# Patient Record
Sex: FEMALE | Race: WHITE | Employment: FULL TIME | URBAN - METROPOLITAN AREA
[De-identification: names, ages, dates, MRNs, and addresses within clinical notes are randomized per-mention and may not be internally consistent; named-entity substitution may affect disease eponyms.]

---

## 2018-11-21 PROBLEM — N81.6 RECTOCELE: Status: ACTIVE | Noted: 2018-11-21

## 2018-11-21 PROBLEM — Z90.722 HISTORY OF BILATERAL SALPINGO-OOPHORECTOMY (BSO): Status: ACTIVE | Noted: 2018-11-21

## 2018-11-21 PROBLEM — Z90.79 HISTORY OF BILATERAL SALPINGO-OOPHORECTOMY (BSO): Status: ACTIVE | Noted: 2018-11-21

## 2018-11-21 PROBLEM — Z90.710 HISTORY OF VAGINAL HYSTERECTOMY: Status: ACTIVE | Noted: 2018-11-21

## 2018-12-10 ENCOUNTER — TELEPHONE (OUTPATIENT)
Dept: UROLOGY | Facility: HOSPITAL | Age: 63
End: 2018-12-10

## 2018-12-10 NOTE — TELEPHONE ENCOUNTER
Pt called inquiring if okay for her to do her normal activities, re:  Walking, bowling, yoga, until she sees Dr. Gillian Pfeiffer in February, about her rectocele. Pt denies constipation. Informed her it would be fine for her to continue normal activities.    Pt aske

## 2019-02-11 ENCOUNTER — OFFICE VISIT (OUTPATIENT)
Dept: UROLOGY | Facility: HOSPITAL | Age: 64
End: 2019-02-11
Attending: OBSTETRICS & GYNECOLOGY
Payer: COMMERCIAL

## 2019-02-11 VITALS — HEIGHT: 63 IN | BODY MASS INDEX: 33.84 KG/M2 | WEIGHT: 191 LBS

## 2019-02-11 DIAGNOSIS — N95.2 POSTMENOPAUSAL ATROPHIC VAGINITIS: Primary | ICD-10-CM

## 2019-02-11 DIAGNOSIS — N81.6 RECTOCELE: ICD-10-CM

## 2019-02-11 PROCEDURE — 99201 HC OUTPT EVAL AND MGNT NEW PT LEVEL 1: CPT

## 2019-02-11 RX ORDER — ESTRADIOL 0.1 MG/G
CREAM VAGINAL
Qty: 1 TUBE | Refills: 3 | Status: SHIPPED | OUTPATIENT
Start: 2019-02-11 | End: 2020-05-11

## 2019-04-22 PROBLEM — Z90.79 HISTORY OF BILATERAL SALPINGO-OOPHORECTOMY (BSO): Status: RESOLVED | Noted: 2018-11-21 | Resolved: 2019-04-22

## 2019-04-22 PROBLEM — E03.9 HYPOTHYROID: Status: ACTIVE | Noted: 2019-04-22

## 2019-04-22 PROBLEM — Z90.722 HISTORY OF BILATERAL SALPINGO-OOPHORECTOMY (BSO): Status: RESOLVED | Noted: 2018-11-21 | Resolved: 2019-04-22

## 2019-04-22 PROBLEM — N81.6 RECTOCELE: Status: RESOLVED | Noted: 2018-11-21 | Resolved: 2019-04-22

## 2019-04-22 PROBLEM — Z90.710 HISTORY OF VAGINAL HYSTERECTOMY: Status: RESOLVED | Noted: 2018-11-21 | Resolved: 2019-04-22

## 2019-08-05 ENCOUNTER — OFFICE VISIT (OUTPATIENT)
Dept: UROLOGY | Facility: HOSPITAL | Age: 64
End: 2019-08-05
Attending: OBSTETRICS & GYNECOLOGY
Payer: COMMERCIAL

## 2019-08-05 VITALS
DIASTOLIC BLOOD PRESSURE: 82 MMHG | SYSTOLIC BLOOD PRESSURE: 128 MMHG | HEIGHT: 62.25 IN | WEIGHT: 190 LBS | BODY MASS INDEX: 34.52 KG/M2

## 2019-08-05 DIAGNOSIS — Z98.890 POST-OPERATIVE STATE: ICD-10-CM

## 2019-08-05 DIAGNOSIS — N95.2 POSTMENOPAUSAL ATROPHIC VAGINITIS: Primary | ICD-10-CM

## 2019-08-05 PROCEDURE — 99211 OFF/OP EST MAY X REQ PHY/QHP: CPT

## 2020-05-11 ENCOUNTER — OFFICE VISIT (OUTPATIENT)
Dept: UROLOGY | Facility: HOSPITAL | Age: 65
End: 2020-05-11
Attending: OBSTETRICS & GYNECOLOGY
Payer: COMMERCIAL

## 2020-05-11 VITALS
DIASTOLIC BLOOD PRESSURE: 82 MMHG | SYSTOLIC BLOOD PRESSURE: 132 MMHG | HEIGHT: 62.5 IN | WEIGHT: 198 LBS | BODY MASS INDEX: 35.52 KG/M2

## 2020-05-11 DIAGNOSIS — N81.6 RECTOCELE: Primary | ICD-10-CM

## 2020-05-11 DIAGNOSIS — N95.2 POSTMENOPAUSAL ATROPHIC VAGINITIS: ICD-10-CM

## 2020-05-11 PROCEDURE — 99212 OFFICE O/P EST SF 10 MIN: CPT

## 2021-05-14 ENCOUNTER — OFFICE VISIT (OUTPATIENT)
Dept: UROLOGY | Facility: HOSPITAL | Age: 66
End: 2021-05-14
Attending: OBSTETRICS & GYNECOLOGY
Payer: COMMERCIAL

## 2021-05-14 VITALS — HEIGHT: 62.5 IN | WEIGHT: 198 LBS | TEMPERATURE: 97 F | BODY MASS INDEX: 35.52 KG/M2

## 2021-05-14 DIAGNOSIS — N95.2 POSTMENOPAUSAL ATROPHIC VAGINITIS: ICD-10-CM

## 2021-05-14 DIAGNOSIS — N81.84 PELVIC MUSCLE WASTING: ICD-10-CM

## 2021-05-14 DIAGNOSIS — R35.1 NOCTURIA: ICD-10-CM

## 2021-05-14 DIAGNOSIS — N81.6 RECTOCELE: Primary | ICD-10-CM

## 2021-05-14 PROCEDURE — 99212 OFFICE O/P EST SF 10 MIN: CPT

## 2021-05-14 RX ORDER — WHEAT DEXTRIN 3 G/3.8 G
POWDER (GRAM) ORAL
COMMUNITY

## 2022-05-16 ENCOUNTER — OFFICE VISIT (OUTPATIENT)
Dept: UROLOGY | Facility: CLINIC | Age: 67
End: 2022-05-16
Attending: OBSTETRICS & GYNECOLOGY
Payer: COMMERCIAL

## 2022-05-16 VITALS — HEIGHT: 62 IN | BODY MASS INDEX: 34.78 KG/M2 | TEMPERATURE: 98 F | WEIGHT: 189 LBS

## 2022-05-16 DIAGNOSIS — N95.2 POSTMENOPAUSAL ATROPHIC VAGINITIS: Primary | ICD-10-CM

## 2022-05-16 PROCEDURE — 57160 INSERT PESSARY/OTHER DEVICE: CPT

## 2022-05-16 PROCEDURE — 99212 OFFICE O/P EST SF 10 MIN: CPT

## 2022-05-16 RX ORDER — ESTRADIOL 0.1 MG/G
CREAM VAGINAL
Qty: 42.5 G | Refills: 3 | Status: SHIPPED | OUTPATIENT
Start: 2022-05-16

## 2022-05-16 NOTE — PROGRESS NOTES
Dr Gil Keto placed pessary. Plan to follow up 5/26/2022 for pessary check and possible self management.

## 2022-05-26 ENCOUNTER — OFFICE VISIT (OUTPATIENT)
Dept: UROLOGY | Facility: CLINIC | Age: 67
End: 2022-05-26
Attending: OBSTETRICS & GYNECOLOGY
Payer: COMMERCIAL

## 2022-05-26 VITALS — WEIGHT: 189 LBS | HEIGHT: 62 IN | TEMPERATURE: 98 F | BODY MASS INDEX: 34.78 KG/M2

## 2022-05-26 DIAGNOSIS — N81.84 PELVIC MUSCLE WASTING: ICD-10-CM

## 2022-05-26 DIAGNOSIS — N90.4 VULVAR DYSTROPHY: Primary | ICD-10-CM

## 2022-05-26 DIAGNOSIS — N81.6 RECTOCELE: ICD-10-CM

## 2022-05-26 DIAGNOSIS — N95.2 POSTMENOPAUSAL ATROPHIC VAGINITIS: ICD-10-CM

## 2022-05-26 PROCEDURE — 99212 OFFICE O/P EST SF 10 MIN: CPT

## 2022-05-26 RX ORDER — CLOBETASOL PROPIONATE 0.5 MG/G
1 OINTMENT TOPICAL 2 TIMES DAILY
Qty: 60 G | Refills: 3 | Status: SHIPPED | OUTPATIENT
Start: 2022-05-26

## 2022-08-19 ENCOUNTER — OFFICE VISIT (OUTPATIENT)
Dept: UROLOGY | Facility: CLINIC | Age: 67
End: 2022-08-19
Attending: OBSTETRICS & GYNECOLOGY
Payer: COMMERCIAL

## 2022-08-19 VITALS — HEIGHT: 62 IN | WEIGHT: 189 LBS | TEMPERATURE: 98 F | BODY MASS INDEX: 34.78 KG/M2

## 2022-08-19 DIAGNOSIS — N90.4 VULVAR DYSTROPHY: Primary | ICD-10-CM

## 2022-08-19 PROCEDURE — 99212 OFFICE O/P EST SF 10 MIN: CPT

## 2023-02-24 ENCOUNTER — OFFICE VISIT (OUTPATIENT)
Dept: UROLOGY | Facility: CLINIC | Age: 68
End: 2023-02-24
Attending: OBSTETRICS & GYNECOLOGY
Payer: MEDICARE

## 2023-02-24 VITALS — BODY MASS INDEX: 34.78 KG/M2 | HEIGHT: 62 IN | TEMPERATURE: 97 F | WEIGHT: 189 LBS

## 2023-02-24 DIAGNOSIS — N90.4 VULVAR DYSTROPHY: Primary | ICD-10-CM

## 2023-02-24 DIAGNOSIS — N81.6 RECTOCELE: ICD-10-CM

## 2023-02-24 DIAGNOSIS — N95.2 POSTMENOPAUSAL ATROPHIC VAGINITIS: ICD-10-CM

## 2023-02-24 PROCEDURE — 99212 OFFICE O/P EST SF 10 MIN: CPT

## 2024-02-13 ENCOUNTER — TELEPHONE (OUTPATIENT)
Dept: UROLOGY | Facility: CLINIC | Age: 69
End: 2024-02-13

## 2024-02-13 NOTE — TELEPHONE ENCOUNTER
Spoke to patient to remind of Yearly appointment on 2/23/24.  Patient confirmed appointment date, time, and clinic location.

## 2024-02-23 ENCOUNTER — OFFICE VISIT (OUTPATIENT)
Dept: UROLOGY | Facility: CLINIC | Age: 69
End: 2024-02-23
Attending: OBSTETRICS & GYNECOLOGY
Payer: MEDICARE

## 2024-02-23 DIAGNOSIS — N81.9 VAGINAL VAULT PROLAPSE: ICD-10-CM

## 2024-02-23 DIAGNOSIS — N81.6 RECTOCELE: Primary | ICD-10-CM

## 2024-02-23 DIAGNOSIS — N95.2 POSTMENOPAUSAL ATROPHIC VAGINITIS: ICD-10-CM

## 2024-02-23 PROCEDURE — 99212 OFFICE O/P EST SF 10 MIN: CPT

## 2024-02-23 RX ORDER — ESTRADIOL 0.1 MG/G
CREAM VAGINAL
Qty: 42.5 G | Refills: 3 | Status: SHIPPED
Start: 2024-02-23

## 2024-02-23 RX ORDER — ESTRADIOL 0.1 MG/G
CREAM VAGINAL
Qty: 42.5 G | Refills: 3 | Status: SHIPPED | OUTPATIENT
Start: 2024-02-23 | End: 2024-02-23

## 2024-02-23 RX ORDER — NYSTATIN 100000 U/G
1 OINTMENT TOPICAL 2 TIMES DAILY
Qty: 30 G | Refills: 0 | Status: SHIPPED | OUTPATIENT
Start: 2024-02-23

## 2024-08-26 ENCOUNTER — TELEPHONE (OUTPATIENT)
Dept: UROLOGY | Facility: CLINIC | Age: 69
End: 2024-08-26

## 2024-08-26 NOTE — TELEPHONE ENCOUNTER
Refill for estrace from pt received via . Call placed t pt that refill was sent to her pharmacy at time of visit in February. States her pharmacy told her they had problems and Rx never came through. Prescription resent electronically.

## 2025-02-18 ENCOUNTER — TELEPHONE (OUTPATIENT)
Dept: UROLOGY | Facility: CLINIC | Age: 70
End: 2025-02-18

## 2025-02-27 ENCOUNTER — OFFICE VISIT (OUTPATIENT)
Dept: UROLOGY | Facility: CLINIC | Age: 70
End: 2025-02-27
Attending: OBSTETRICS & GYNECOLOGY
Payer: MEDICARE

## 2025-02-27 VITALS — HEIGHT: 62 IN | BODY MASS INDEX: 35 KG/M2 | RESPIRATION RATE: 16 BRPM | TEMPERATURE: 98 F

## 2025-02-27 DIAGNOSIS — N81.84 PELVIC MUSCLE WASTING: ICD-10-CM

## 2025-02-27 DIAGNOSIS — N81.6 RECTOCELE: ICD-10-CM

## 2025-02-27 DIAGNOSIS — N95.2 POSTMENOPAUSAL ATROPHIC VAGINITIS: ICD-10-CM

## 2025-02-27 DIAGNOSIS — N81.9 VAGINAL VAULT PROLAPSE: Primary | ICD-10-CM

## 2025-02-27 PROCEDURE — 99212 OFFICE O/P EST SF 10 MIN: CPT

## 2025-02-27 NOTE — PATIENT INSTRUCTIONS
Name Address [facility] Phone/Fax Contact   [facility] [address]  [address]   [facility]   [address] PH: [phone]  FAX: [phone]   Celsa Her     [facility] [address]   [facility]   [address] PH: [phone]  FAX: [phone]    [facility]   [address]   [place]   [place]   [address]   PH: [phone]    [facility] [address]   [facility]   [address] PH: [phone]  FAX: [phone]   Benita Plummer   [facility]   [address]    [facility]    [address] PH: [phone]  FAX: [phone] Key Mccord Benjamin [facility]   [facility]   [address]     [facility]     [address]   PH: [phone]  FAX: [phone]   Destiny Driscoll   [facility] [address]  [address]   [facility]   [address] PH: [phone]  FAX: [phone]   Shanti Mosqueda   [facility]    [address]   [facility]   [address]   PH: [phone]   Anthony Corona     [facility] [address]   [facility]   [address] PH: [phone]  FAX: [phone] [facility]  [facility]   [address]   [facility]   [address] PH: [phone]  FAX: [phone]        [facility]   [address]   [facility]   [address] PH: [phone]  FAX: [phone]   Chanelle Chaudhari   [facility] [address]  [address]   [facility]   [address] PH: [phone]  FAX: [phone]   Lisette Yan   [facility]   [address]   [facility]   [address] PH: [phone]  FAX: [phone] Lili Paul  [facility] [address] [place] [address] [address]. Foss  300   [facility]   [address] PH: [phone]  FAX: [phone] Lisette Madrid  Lena Sadia   [facility] [address]   [facility]   [address] PH: [phone]  FAX: [phone]   Kadie Fernandez     [facility] [address]   [facility]   [address] PH: [phone]  FAX: [phone]   Kelle Blunt   [facility] [address] [address]   [facility]   [address] PH: [phone]  FAX: [phone]   Alayna Robins     [facility] [address] [facility]   [address] PH: [phone]  FAX: [phone]      [facility]   [address]   [address]   PH: [phone]   Britta Joyner     [facility] [address] [facility]   [address] PH: [phone]  FAX: [phone]   [facility] [address] [facility]   [address] PH: [phone]  FAX: [phone] Francia Hernandez [facility] [address]  [address]   [facility]   [address]   PH: [phone] Anton[facility] Crockett [facility] [address] [facility]   [address] PH: [phone]  FAX: [phone]    [facility]   [address] [facility]   [address] PH: [phone]  FAX: [phone]   Elida Nguyen     [facility] [address] [address] [facility]   [address] PH: [phone]  FAX: [phone]      [facility]   [address] [facility]   [address] PH: [phone]  FAX: [phone]    [facility] [address]  [address]   [facility]   [address] PH: [phone]  FAX: [phone]   Deja Mcgarry   [facility]   [address] [facility]   [address] PH: [phone]  FAX: [phone]   [facility] [address] [place] [address] [address]. [address]   [facility]    [address] PH: [phone]  FAX: [phone]   Neeru Xavier   [facility]   [address]   [place]   [place]   [address] PH: [phone]  FAX: [phone] Mari Dickersonceciliaramiro Matti   [facility]   [address]   [facility]   [address] PH: [phone]  FAX: [phone]   Juju [facility]   [address]   [facility]   [address] PH: [phone]  FAX: [phone] [facility]     [facility]   [address] [facility]   [address] PH: [phone]  FAX: [phone]      [facility]   [address] [facility]   [address] PH: [phone]  FAX: [phone] Delilah Waggoner [facility]   [address] [facility] [facility]   [address]   PH: [phone] Elma Gonzalez   [facility]   [address]   [facility]   [address] PH: [phone]  FAX: [phone] Chanelle Parham [facility]   [address]   [place]   [place]   [address] PH: [phone]  FAX: [phone]   Aysha Pedro   [facility]   [address]   [facility]   [address]   PH: [phone]    [facility] [address]  Lower Level   [place]   [place]   [address]   PH: [phone]      [facility]   [address]   [place]   [address] PH: [phone]  FAX: [phone]      [facility] [address]  [address]   [facility]   [address] PH: [phone]  FAX: [phone]   Madonna Vargas   [facility] [address]   Ronnie [place]GRE   [address] PH: [phone]  FAX: [phone]   Aubrey Thompson     [facility] [address] [place] [address].  [address]   Ronnie UNGER   [address]   PH: [phone] Cecily Brizuelalife  Physical Therapy [address] [address]   [place]   [address] PH: [phone]  FAX: [phone]   Mary Novoa   [facility]   [address]   [facility]   [address] PH: [phone]  FAX: [phone]   Lupis Mckay     [facility] [address] [place]  [address]   [facility]   [address] PH:       [facility]   [address] [facility]   [address] PH: [phone]  FAX: [phone]    [facility] [address] [facility]. 2nd Floor   [facility]   [address]   PH: [phone]    [facility] [address] [facility]  [address]   [facility]   [address] PH: [phone]  FAX: [phone] Babs Kaplan   [facility]   [address] [facility]   [address] PH: [phone]  FAX: [phone] Amanda Blakely     [facility]   [address] [facility]   [address] PH: [phone]  FAX: [phone]      [facility]   [address].   [place]   [address] PH: [phone]  FAX: [phone]    [facility] [address]  [address]   [place]   [address] PH: [phone]  FAX: [phone] Eam Pereainal     [facility] [address]   [facility]   [address] PH: [phone]  FAX: [phone]   Amanda Perkins   [facility] [address] [place] [address] [address]   [facility]   [address] PH: [phone]  FAX: [phone] Anne Albarran [facility] [address] [facility] [facility] [address]  [address]   [facility]   [address] PH: [phone]  FAX: [phone] Dayana Pantoja  [facility]   [address] [facility] [address]   [place]   [address] PH: [phone]  FAX: [phone]   [facility] [address] [place] [address] 121   [place]   [place]   [address] PH:  [phone]  FAX: [phone]   Lisette Valentino     [facility] [address]  [address]   [facility]   [address]   PH: [phone]   Jayda Sarmiento   [facility] [address]  [address]   [facility]   [address] PH: [phone]  FAX: [phone] John Blakely     [facility] [address]   [facility]   [address] PH: [phone]  FAX: [phone]   [facility]   [address] [facility] [address]   [facility]   [address] PH: [phone]  FAX: [phone]   Andreina Huffman     [facility] [address]   [facility]   [address] PH: [phone]  FAX: [phone]    [facility] [address]   [facility]   [address] PH: [phone]  FAX: [phone]      [facility]   [address]   [facility]   [address] PH: [phone]  FAX: [phone]    [facility]   [address]   [facility]   [address] PH: [phone]  FAX: [phone] Pattie Blackwood     [facility]   [address]   [facility]   [address] PH: [phone]  FAX: [phone]   Rayna Jaffe   [facility]   [address]   [facility]   [address] PH: [phone]  FAX: [phone] Dayana Magallon     [facility]   [address]   [facility]   [address] PH: [phone]  FAX: [phone]      [facility] [address] [facility], [address]   [facility]   [address] PH: [phone]  FAX: [phone] [facility] on [facility]   [facility] [address] [address] [place] [address]. A [address]   [facility]   [address] PH: [phone]  FAX: [phone] Maria Elena Wills Denia     [facility]   [address]   [facility]   [address] PH:  [phone]  FAX: [phone]    [facility] [address]  [address]   [facility]   [address] PH: [phone]  FAX: [phone]   Yamileth Mayes   [facility] [address]   [facility]   [address] PH: [phone]  FAX: [phone]   [facility] [address]  [address]   [facility]   [address]   PH: [phone]      [facility]   [address] [place]   [place]   [address] PH: [phone]  FAX: [phone]   Lulu Knowles   [facility] [address]  [address]   [facility]   [address] PH: [phone]  FAX: [phone] Alisa Yoon   [facility] [address]   [facility]   [address] PH: [phone]  FAX: [phone]   Pattie De La Garza     [facility] [address]   [facility]   [address] PH: [phone]  FAX: [phone]   Marybeth Hair   [facility] [address]   [facility]   [address]   PH: [phone]      [facility] [address]   [facility]   [address] PH: [phone]  FAX: [phone]    [facility] [address]  [address]   [facility]    [address] PH: [phone]  FAX: [phone] Malia Moreno   [facility] [address]  [address]   [facility]   [address] PH: [phone]  FAX: [phone]   Mary Nuñez   [facility] [address]   [facility]   [address] PH: [phone]  FAX: [phone]   Red Domínguez

## 2025-02-27 NOTE — PROGRESS NOTES
Pt presents to follow up bulge  Doing well with #3 ring with support pessary, home care  Reports pessary is comfortable   Denies discharge  Denies bleeding  Denies s/sx of UTI  Bowels regular, some FI, using fiber daily   Pt is removing pessary at home every other week   Pt is using vaginal estrogen cream three times weekly  S/P VH, BS, PER, and cysto in 2015    Happy with pessary, feels supported  She is not currently interested in surgical management of her pelvic organ prolapse.    Urogynecology Summary:  Urogynecology Summary  Prolapse: Yes  TOMI: No  Urge Incontinence: No  Nocturia Frequency: 2  Frequency: 2 hours  Incomplete emptying: No  Constipation: No  Wears pad day?:  (pantyliner)  Wears Pad Night?:  (pantyliner)  Activities are limited by UI/POP?: No  Currently Sexually Active: No      Vitals:  Vitals:    02/27/25 0925   Resp: 16   Temp: 98 °F (36.7 °C)       GENERAL EXAM:  GENERAL:  NAD  HEAD: NC/AT  EYES: symmetric bilaterally. No icterus. Sclera w/o injection  NECK: no masses  LUNGS:  Normal resp effort  ABDOMEN:  Soft, no mass  MUSK: normal gait, ROM wnl. No edema  PSYCH: A&Ox3. Recent and remote memory intact    PELVIC EXAM: NOE Russ, present for exam as a chaperone  Ext. 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 Postmenopausal atrophic vaginitis  N95.2       3. Rectocele  N81.6       4. Pelvic muscle wasting  N81.84             Discussion Items:   Discussed mgmt of vulvovaginal atrophy with vaginal estrogen cream. Reviewed associated benefits, risks, alternatives, and goals. Recommend low dose 2-3x weekly mgmt   Discussed management of  pelvic organ prolapse including but not limited to behavioral modifications, conservative options, and surgical management.   Discussed pessary management including benefits and risks. Discussed importance of keeping regularly scheduled pessary checks in prevention of complications related to pessary use.     Continue pessary management, home care  Continue vag estrogen 2-3x weekly  Recommend PFPT for rectocele   Daily pelvic exercises  Bowel management reviewed  Call with s/sx of UTI, problems with pessary   All questions answered  Pt understands and agrees to plan       Return in about 1 year (around 2/27/2026) for pessary care, sooner prn .      Lili Ventura PA-C      The 21st Century Cures Act makes medical notes like these available to patients in the interest of transparency. However, be advised this is a medical document. It is intended as peer to peer communication. It is written in medical language and may contain abbreviations or verbiage that are unfamiliar. It may appear blunt or direct. Medical documents are intended to carry relevant information, facts as evident, and the clinical opinion of the practitioner.